# Patient Record
Sex: MALE | Race: WHITE | URBAN - METROPOLITAN AREA
[De-identification: names, ages, dates, MRNs, and addresses within clinical notes are randomized per-mention and may not be internally consistent; named-entity substitution may affect disease eponyms.]

---

## 2018-02-12 ENCOUNTER — TELEPHONE (OUTPATIENT)
Dept: FAMILY MEDICINE CLINIC | Age: 53
End: 2018-02-12

## 2018-02-12 RX ORDER — OSELTAMIVIR PHOSPHATE 75 MG/1
75 CAPSULE ORAL 2 TIMES DAILY
Qty: 10 CAPSULE | Refills: 0 | Status: SHIPPED | OUTPATIENT
Start: 2018-02-12 | End: 2018-02-17

## 2018-02-12 NOTE — TELEPHONE ENCOUNTER
Pts wife called needing a script for Tamiflu  Pt is leaving to go overse[place] tomorrow  Pt hasnt been seen in 2 years but wife states  is a friend outside of the office and \"Ac\" has done this before  Wife asks that Dr Chanell Ruiz call pt and speak to him directly  If applicable, please send to the pharmacy listed